# Patient Record
Sex: MALE | Race: WHITE | NOT HISPANIC OR LATINO | Employment: FULL TIME | URBAN - METROPOLITAN AREA
[De-identification: names, ages, dates, MRNs, and addresses within clinical notes are randomized per-mention and may not be internally consistent; named-entity substitution may affect disease eponyms.]

---
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
## 2022-04-11 ENCOUNTER — HISTORICAL (OUTPATIENT)
Dept: ADMINISTRATIVE | Facility: HOSPITAL | Age: 22
End: 2022-04-11

## 2022-04-28 VITALS
OXYGEN SATURATION: 98 % | BODY MASS INDEX: 22.84 KG/M2 | HEIGHT: 72 IN | SYSTOLIC BLOOD PRESSURE: 107 MMHG | DIASTOLIC BLOOD PRESSURE: 52 MMHG | WEIGHT: 168.63 LBS

## 2022-05-04 NOTE — HISTORICAL [facility] CERNER
This is a historical note converted from [employer]. Formatting and pictures may have been removed.  Please reference Seb for original formatting and attached multimedia. Chief Complaint  dysuria, frequency x today  History of Present Illness  21-year-old male presents to clinic complaining of a 2-day history of?urinary burning and frequency and penile discharge.? Patient was sexually active approximately 6 weeks ago.? Denies any belly pain back pain or fever.? Denies any?rashes or bumps?in the genital area.? States he is having a lot of discomfort and it is painful to walk.  Review of Systems  Constitutional: negative except as stated in HPI  ?Eye: negative except as stated in HPI  ?ENT: negative except as stated in HPI  ?Respiratory: negative except as stated in HPI  ?Cardiovascular: negative except as stated in HPI  ?Gastrointestinal: negative except as stated in HPI  ?Genitourinary: negative except as stated in HPI  ?Hema/Lymph: negative except as stated in HPI  ?Endocrine: negative except as stated in HPI  ?Immunologic: negative except as stated in HPI  ?Musculoskeletal: negative except as stated in HPI  ?Integumentary: negative except as stated in HPI  ?Neurologic: negative except as stated in HPI  ?All Other ROS_ negative except as stated in HPI  Physical Exam  Vitals & Measurements  T:?36.6? ?C (Oral)? HR:?78(Peripheral)? RR:?16? BP:?107/52? SpO2:?98%?  HT:?184.00?cm? WT:?76.500?kg? BMI:?22.6?  General_well-developed well-nourished in no acute distress  Gastrointestinal_no suprapubic tenderness  Genitourinary_no CVA tenderness to palpation  Integumentary_no rashes or skin lesions present?  ?  Assessment/Plan  1.?Urethritis?N34.2  ?Antibiotics sent to pharmacy. ?Start them today.? No sex for 1 week.? We will call you with the results of your urine culture and other lab testing when it becomes available.? If your symptoms persist or worsen return to clinic or seek medical attention  immediately  Ordered:  cefTRIAXone, 500 mg, form: Injection Other - see special instructions, IM, Once-Unscheduled, first dose 05/11/21 20:22:00 CDT, urethritis  doxycycline, 100 mg = 1 cap(s), Oral, BID, X 7 day(s), # 14 cap(s), 0 Refill(s), [id number], 184, cm, Height/Length Dosing, 05/11/21 20:14:00 CDT, 76.5, kg, Weight Dosing, 05/11/21 20:14:00 CDT  Lidocaine inj., 1.8 mL, form: Injection, IM, Once-Unscheduled, first dose 05/11/21 20:22:00 CDT  Chlam trachom & N gonorrhoeae by JENNIFER-[facility] [id number], Routine collect, Urine, 05/11/21 20:23:00 CDT, Stop date 05/11/21 20:23:00 CDT, Nurse collect, Urethritis, 05/11/21 20:23:00 CDT  Urine Culture [id number], Routine collect, 05/11/21 20:23:00 CDT, Urine, Nurse collect, Stop date 05/11/21 20:23:00 CDT, Urethritis  ?  Orders:  injections IM/SQ, 05/11/21 20:21:00 CDT, 05/11/21 20:21:00 CDT, Dysuria   Problem List/Past Medical History  Ongoing  No chronic problems  Historical  No qualifying data  Procedure/Surgical History  Myringotomy   Medications  cefTRIAXone, 500 mg= 1 vial(s), IM, Once-Unscheduled  doxycycline monohydrate 100 mg oral capsule, 100 mg= 1 cap(s), Oral, BID  Lidocaine 1% PF 5ml inj, 1.8 mL, IM, Once-Unscheduled  Allergies  No Known Allergies  No Known Medication Allergies  Social History  Abuse/Neglect  No, No, Yes, 05/11/2021  Alcohol  Never, 01/09/2020  Substance Use  Never, 01/09/2020  Tobacco  Never (less than 100 in lifetime), N/A, 05/11/2021  Family History  Family history is negative  Health Maintenance  Health Maintenance  ???Pending?(in the next year)  ??? ??OverDue  ??? ? ? ?Influenza Vaccine due??10/01/20??and every 1??day(s)  ??? ??Due?  ??? ? ? ?Tetanus Vaccine due??05/11/21??and every 10??year(s)  ??? ??Due In Future?  ??? ? ? ?Obesity Screening not due until??01/01/22??and every 1??year(s)  ??? ? ? ?Alcohol Misuse Screening not due until??01/02/22??and every 1??year(s)  ???Satisfied?(in the past 1 year)  ???  ??Satisfied?  ??? ? ? ?ADL Screening on??05/11/21.??Satisfied by Fortunato ALBERTS Tika P.  ??? ? ? ?Alcohol Misuse Screening on??05/11/21.??Satisfied by Fortunato ALBERTS Tika P.  ??? ? ? ?Blood Pressure Screening on??05/11/21.??Satisfied by Fortunato ALBERTS Tika P.  ??? ? ? ?Body Mass Index Check on??05/11/21.??Satisfied by Fortunato ALBERTS Tika P.  ??? ? ? ?Depression Screening on??05/11/21.??Satisfied by Fortunato ALBERTS Tika P.  ??? ? ? ?Obesity Screening on??05/11/21.??Satisfied by Fortunato ALBERTS Tika P.  ?

## 2022-09-21 ENCOUNTER — HISTORICAL (OUTPATIENT)
Dept: ADMINISTRATIVE | Facility: HOSPITAL | Age: 22
End: 2022-09-21

## 2023-09-05 PROCEDURE — 86665 EPSTEIN-BARR CAPSID VCA: CPT | Performed by: FAMILY MEDICINE

## 2025-07-06 ENCOUNTER — OFFICE VISIT (OUTPATIENT)
Dept: URGENT CARE | Facility: CLINIC | Age: 25
End: 2025-07-06
Payer: COMMERCIAL

## 2025-07-06 VITALS
HEART RATE: 90 BPM | WEIGHT: 185 LBS | SYSTOLIC BLOOD PRESSURE: 122 MMHG | OXYGEN SATURATION: 96 % | BODY MASS INDEX: 25.06 KG/M2 | TEMPERATURE: 98 F | HEIGHT: 72 IN | RESPIRATION RATE: 18 BRPM | DIASTOLIC BLOOD PRESSURE: 69 MMHG

## 2025-07-06 DIAGNOSIS — J02.9 SORE THROAT: ICD-10-CM

## 2025-07-06 DIAGNOSIS — U07.1 COVID-19: Primary | ICD-10-CM

## 2025-07-06 DIAGNOSIS — R09.81 NASAL CONGESTION: ICD-10-CM

## 2025-07-06 LAB
CTP QC/QA: YES
MOLECULAR STREP A: NEGATIVE
POC MOLECULAR INFLUENZA A AGN: NEGATIVE
POC MOLECULAR INFLUENZA B AGN: NEGATIVE
SARS-COV+SARS-COV-2 AG RESP QL IA.RAPID: POSITIVE

## 2025-07-06 PROCEDURE — 87502 INFLUENZA DNA AMP PROBE: CPT | Mod: QW,,, | Performed by: FAMILY MEDICINE

## 2025-07-06 PROCEDURE — 87651 STREP A DNA AMP PROBE: CPT | Mod: QW,,, | Performed by: FAMILY MEDICINE

## 2025-07-06 PROCEDURE — 87811 SARS-COV-2 COVID19 W/OPTIC: CPT | Mod: QW,,, | Performed by: FAMILY MEDICINE

## 2025-07-06 PROCEDURE — 99203 OFFICE O/P NEW LOW 30 MIN: CPT | Mod: 25,,, | Performed by: FAMILY MEDICINE

## 2025-07-06 PROCEDURE — 96372 THER/PROPH/DIAG INJ SC/IM: CPT | Mod: ,,, | Performed by: FAMILY MEDICINE

## 2025-07-06 RX ORDER — BETAMETHASONE SODIUM PHOSPHATE AND BETAMETHASONE ACETATE 3; 3 MG/ML; MG/ML
9 INJECTION, SUSPENSION INTRA-ARTICULAR; INTRALESIONAL; INTRAMUSCULAR; SOFT TISSUE ONCE
Status: COMPLETED | OUTPATIENT
Start: 2025-07-06 | End: 2025-07-06

## 2025-07-06 RX ADMIN — BETAMETHASONE SODIUM PHOSPHATE AND BETAMETHASONE ACETATE 9 MG: 3; 3 INJECTION, SUSPENSION INTRA-ARTICULAR; INTRALESIONAL; INTRAMUSCULAR; SOFT TISSUE at 01:07

## 2025-07-06 NOTE — PATIENT INSTRUCTIONS
COVID-19 Test positive, condition and course discussed  Adequate hydration and rest. Monitor the symptoms closely  Recommendation is to follow a timeline quarantine measures per CDC and LDH  Tylenol or ibuprofen as needed for fever, body aches and headache. Antihistamine of choice for congestion.   Robitussin-DM for cough and cold as needed and as directed.  Trial of vitamin-C and vitamin D3 for 1 week.  Recommendation is to home quarantine until no fever (100.4 and above) for 24 hours and with improved symptoms   Celestone IM today as anti inflammation for symptom relief, risk and benefits discussed voiced understanding  Discussed in detail on antiviral medication Paxlovid, defers today.  Can call clinic if they consider the prescription, within 5 days starting symptoms  Warm saltwater gargles for sore throat  Self-care and home quarantine instructions like- Wear a mask, cover your cough and sneeze. Clean any shared surfaces  Call or return to clinic for any questions. ER precautions with any acute change in symptoms. Follow up with primary MD  Flu test negative, strep test negative

## 2025-07-06 NOTE — PROGRESS NOTES
Subjective:      Patient ID: John Gonzales is a 25 y.o. male.    Vitals:  height is 6' (1.829 m) and weight is 83.9 kg (185 lb). His temperature is 98 °F (36.7 °C). His blood pressure is 122/69 and his pulse is 90. His respiration is 18 and oxygen saturation is 96%.     Chief Complaint: Sore Throat     Patient is a 25 y.o. male who presents to urgent care with complaints of sore throat, chills, sweats, sinus congestion, sinus pressure, cough, fatigue x last night . Alleviating factors include none.       [place]:  25-year-old male otherwise healthy present to clinic with concerns of sore throat, chills, congestion, sinus pressure, postnasal drip and coughing since last night.  Can not recall any positive exposure to infections, states was camping with friends who all looked okay.  Has tolerated cortisone injection in the past with no issues, understands the risks and benefits    ROS :  Constitutional : _ fever , Body aches, Chills, T-max at home yesterday 101, had fever prior coming to the clinic.  Has taken Advil  Eyes : _No redness, drainage or pain  HENT_sore throat, postnasal drainage  Respiratory_no wheezing, no shortness of breath  Cardiovascular_no chest pain  Gastrointestinal_ No vomiting, No diarrhea, No abdominal pain  Musculoskeletal_no joint pain, no joint swelling  Integumentary_no skin rash or abnormal lesion    Objective:     Physical Exam  General : Alert and Oriented, No apparent distress, afebrile, sounds stuffy and congested  Neck - supple  HENT : Oropharynx and palate appears erythematous, no swelling, bilateral TMs intact mild fluid no redness.   Respiratory : Bilateral equal breath sounds, nonlabored respirations  Cardiovascular : Rate, rhythm regular, normal volume pulse, no murmur  Gastrointestinal: Full abdomen, soft, nontender to palpate  Integumentary : Warm, Dry and no rash    Assessment:     1. COVID-19    2. Sore throat    3. Nasal congestion      Plan:   COVID-19 Test positive, condition and  course discussed  Adequate hydration and rest. Monitor the symptoms closely  Recommendation is to follow a timeline quarantine measures per CDC and LDH  Tylenol or ibuprofen as needed for fever, body aches and headache. Antihistamine of choice for congestion.   Robitussin-DM for cough and cold as needed and as directed.  Trial of vitamin-C and vitamin D3 for 1 week.  Recommendation is to home quarantine until no fever (100.4 and above) for 24 hours and with improved symptoms   Celestone IM today as anti inflammation for symptom relief, risk and benefits discussed voiced understanding  Discussed in detail on antiviral medication Paxlovid, defers today.  Can call clinic if they consider the prescription, within 5 days starting symptoms  Warm saltwater gargles for sore throat  Self-care and home quarantine instructions like- Wear a mask, cover your cough and sneeze. Clean any shared surfaces  Call or return to clinic for any questions. ER precautions with any acute change in symptoms. Follow up with primary MD  Flu test negative, strep test negative    COVID-19  -     betamethasone acetate-betamethasone sodium phosphate injection 9 mg    Sore throat  -     POCT Influenza A/B MOLECULAR  -     SARS Coronavirus 2 Antigen, POCT Manual Read  -     POCT Strep A, Molecular    Nasal congestion  -     betamethasone acetate-betamethasone sodium phosphate injection 9 mg